# Patient Record
Sex: MALE | Race: WHITE | NOT HISPANIC OR LATINO
[De-identification: names, ages, dates, MRNs, and addresses within clinical notes are randomized per-mention and may not be internally consistent; named-entity substitution may affect disease eponyms.]

---

## 2019-05-22 PROBLEM — Z00.00 ENCOUNTER FOR PREVENTIVE HEALTH EXAMINATION: Status: ACTIVE | Noted: 2019-05-22

## 2019-06-14 ENCOUNTER — APPOINTMENT (OUTPATIENT)
Dept: GASTROENTEROLOGY | Facility: CLINIC | Age: 54
End: 2019-06-14
Payer: COMMERCIAL

## 2019-06-14 VITALS
OXYGEN SATURATION: 98 % | WEIGHT: 212 LBS | SYSTOLIC BLOOD PRESSURE: 138 MMHG | HEIGHT: 71 IN | BODY MASS INDEX: 29.68 KG/M2 | HEART RATE: 72 BPM | DIASTOLIC BLOOD PRESSURE: 90 MMHG

## 2019-06-14 DIAGNOSIS — K86.2 CYST OF PANCREAS: ICD-10-CM

## 2019-06-14 PROCEDURE — 99244 OFF/OP CNSLTJ NEW/EST MOD 40: CPT

## 2019-06-14 NOTE — PHYSICAL EXAM
[General Appearance - Alert] : alert [Sclera] : the sclera and conjunctiva were normal [General Appearance - In No Acute Distress] : in no acute distress [Outer Ear] : the ears and nose were normal in appearance [Neck Appearance] : the appearance of the neck was normal [] : no respiratory distress [Apical Impulse] : the apical impulse was normal [Abdomen Soft] : soft [Abdomen Tenderness] : non-tender [Abnormal Walk] : normal gait [Skin Color & Pigmentation] : normal skin color and pigmentation [Oriented To Time, Place, And Person] : oriented to person, place, and time [Cranial Nerves] : cranial nerves 2-12 were intact

## 2019-06-14 NOTE — ASSESSMENT
[FreeTextEntry1] : Will plan on an EUS in 1 year.  I have discussed current recommendations for pancreatic cyst surveillance in detail with Mr. Johnson.  Currently, pancreatic cysts less than 1 cm without additional high risk imaging features are very likely benign in nature and can likely be monitored initially with yearly surveillance, followed by extending the surveillance interval.  I have offered an EUS at this time if he is uncomfortable with his previous imaging, he prefers to wait 1 year, which I think is reasonable.\par \par Thank you for referring Mr. DHALIWAL.  Please do not hesitate to call to further discuss his/her care.\par \par Note faxed to requesting MD.\par \par

## 2019-06-14 NOTE — HISTORY OF PRESENT ILLNESS
[FreeTextEntry1] : Mr. Latif is a pleasant 54M h/o depression/anxiety, HTN, HLD, cholecystectomy who is seen at the request of Dr. Israel for evaluation of pancreatic cysts.  He brings an MRI report from [facility] recently performed (see scanned report for full details) showing two 4 mm pancreatic cysts, no additional abnormalities.  He feels well, no complaints.  No abdominal pain, weight loss, jaundice/icterus, diarrhea or constipation.\par \par He has a family history of colorectal cancer in his mother in her late 30's or early 40's, he has been having a colonoscopy every 5 years due to increased risk, has had a total of 3 colonoscopies previously.\par \par Has never had other pancreatic imaging.  No history of drinking or smoking.  No other complaints.

## 2020-07-19 ENCOUNTER — RESULT REVIEW (OUTPATIENT)
Age: 55
End: 2020-07-19

## 2020-07-21 ENCOUNTER — RESULT REVIEW (OUTPATIENT)
Age: 55
End: 2020-07-21

## 2020-07-22 ENCOUNTER — APPOINTMENT (OUTPATIENT)
Dept: GASTROENTEROLOGY | Facility: HOSPITAL | Age: 55
End: 2020-07-22

## 2021-08-26 ENCOUNTER — NON-APPOINTMENT (OUTPATIENT)
Age: 56
End: 2021-08-26

## 2021-08-26 DIAGNOSIS — Z78.9 OTHER SPECIFIED HEALTH STATUS: ICD-10-CM

## 2021-08-26 DIAGNOSIS — Z98.890 OTHER SPECIFIED POSTPROCEDURAL STATES: ICD-10-CM

## 2021-08-26 DIAGNOSIS — G98.8 OTHER DISORDERS OF NERVOUS SYSTEM: ICD-10-CM

## 2021-08-26 DIAGNOSIS — Z87.891 PERSONAL HISTORY OF NICOTINE DEPENDENCE: ICD-10-CM

## 2021-09-07 ENCOUNTER — RESULT REVIEW (OUTPATIENT)
Age: 56
End: 2021-09-07

## 2021-09-10 ENCOUNTER — APPOINTMENT (OUTPATIENT)
Dept: SURGERY | Facility: HOSPITAL | Age: 56
End: 2021-09-10
Payer: COMMERCIAL

## 2021-09-10 PROCEDURE — 49650 LAP ING HERNIA REPAIR INIT: CPT | Mod: 50

## 2021-09-23 ENCOUNTER — APPOINTMENT (OUTPATIENT)
Dept: GASTROENTEROLOGY | Facility: CLINIC | Age: 56
End: 2021-09-23
Payer: COMMERCIAL

## 2021-09-23 VITALS
SYSTOLIC BLOOD PRESSURE: 120 MMHG | HEIGHT: 71 IN | WEIGHT: 191 LBS | TEMPERATURE: 96.7 F | HEART RATE: 60 BPM | OXYGEN SATURATION: 98 % | DIASTOLIC BLOOD PRESSURE: 78 MMHG | BODY MASS INDEX: 26.74 KG/M2

## 2021-09-23 DIAGNOSIS — K59.09 OTHER CONSTIPATION: ICD-10-CM

## 2021-09-23 PROCEDURE — 99213 OFFICE O/P EST LOW 20 MIN: CPT

## 2021-09-23 NOTE — HISTORY OF PRESENT ILLNESS
[FreeTextEntry1] : Mr. Latif is a pleasant 56M h/o depression/anxiety, HTN, HLD, cholecystectomy who returns for f/u.\par \par Recently he has been having worsening constipation.  He has gone 7-8 days without a BM, will use dulcolax (rectal or PO) with a resultant large brown BM. No blood or black stool.\par \par Feels well, no discomfort.\par \par Has increased water intake, does not eat much fiber.\par \par Has lost significant weight through diet and exercise, all intentionally.\par \par He has a family history of colorectal cancer in his mother in her late 30's or early 40's, he has been having a colonoscopy every 5 years due to increased risk, has had a total of 3 colonoscopies previously.  Most recent was 2018 by Dr. Winn.

## 2021-09-23 NOTE — ASSESSMENT
[FreeTextEntry1] : Will start miralax 1 dose every evening.\par \par Advised to increase fiber intake.\par \par Will continue with his adequate water intake.\par \par If not improving, would give a trial of Linzess.

## 2021-09-28 ENCOUNTER — APPOINTMENT (OUTPATIENT)
Dept: SURGERY | Facility: CLINIC | Age: 56
End: 2021-09-28
Payer: COMMERCIAL

## 2021-09-28 VITALS — BODY MASS INDEX: 26.74 KG/M2 | WEIGHT: 191 LBS | HEIGHT: 71 IN

## 2021-09-28 DIAGNOSIS — E78.5 HYPERLIPIDEMIA, UNSPECIFIED: ICD-10-CM

## 2021-09-28 DIAGNOSIS — K40.00 BILATERAL INGUINAL HERNIA, WITH OBSTRUCTION, W/OUT GANGRENE, NOT SPECIFIED AS RECURRENT: ICD-10-CM

## 2021-09-28 DIAGNOSIS — I10 ESSENTIAL (PRIMARY) HYPERTENSION: ICD-10-CM

## 2021-09-28 DIAGNOSIS — F31.9 BIPOLAR DISORDER, UNSPECIFIED: ICD-10-CM

## 2021-09-28 DIAGNOSIS — K40.90 UNILATERAL INGUINAL HERNIA, W/OUT OBSTRUCTION OR GANGRENE, NOT SPECIFIED AS RECURRENT: ICD-10-CM

## 2021-09-28 DIAGNOSIS — M10.9 GOUT, UNSPECIFIED: ICD-10-CM

## 2021-09-28 DIAGNOSIS — G47.30 SLEEP APNEA, UNSPECIFIED: ICD-10-CM

## 2021-09-28 DIAGNOSIS — K21.9 GASTRO-ESOPHAGEAL REFLUX DISEASE W/OUT ESOPHAGITIS: ICD-10-CM

## 2021-09-28 PROCEDURE — 99024 POSTOP FOLLOW-UP VISIT: CPT

## 2021-09-28 RX ORDER — ESCITALOPRAM OXALATE 20 MG/1
20 TABLET ORAL
Refills: 0 | Status: ACTIVE | COMMUNITY

## 2021-09-28 RX ORDER — CARVEDILOL PHOSPHATE 40 MG/1
40 CAPSULE, EXTENDED RELEASE ORAL
Refills: 0 | Status: ACTIVE | COMMUNITY

## 2021-09-28 RX ORDER — LAMOTRIGINE 200 MG/1
200 TABLET ORAL
Refills: 0 | Status: ACTIVE | COMMUNITY

## 2021-09-28 RX ORDER — BUPROPION HYDROCHLORIDE 150 MG/1
150 TABLET, EXTENDED RELEASE ORAL
Refills: 0 | Status: ACTIVE | COMMUNITY

## 2021-09-28 RX ORDER — ASPIRIN 81 MG
81 TABLET, DELAYED RELEASE (ENTERIC COATED) ORAL
Refills: 0 | Status: ACTIVE | COMMUNITY

## 2021-09-28 RX ORDER — AMLODIPINE BESYLATE 5 MG/1
5 TABLET ORAL
Refills: 0 | Status: ACTIVE | COMMUNITY

## 2021-09-28 RX ORDER — ALLOPURINOL 100 MG/1
100 TABLET ORAL
Refills: 0 | Status: ACTIVE | COMMUNITY

## 2021-09-28 RX ORDER — BUPROPION HYDROCHLORIDE 300 MG/1
300 TABLET, EXTENDED RELEASE ORAL
Refills: 0 | Status: ACTIVE | COMMUNITY

## 2021-09-28 RX ORDER — ATORVASTATIN CALCIUM 40 MG/1
40 TABLET, FILM COATED ORAL
Refills: 0 | Status: ACTIVE | COMMUNITY

## 2021-09-28 NOTE — ASSESSMENT
[FreeTextEntry1] : Patient presents today for follow-up after bilateral lap inguinal hernia repair. He is no complaints at this time. Patient happy with the results. He was concerned he may have ripped his repair given the fact that he had a severe issue with chronic constipation. Patient denies having a lump at this point he has had no complaints of nausea or vomiting.\par \par Physical examination the incisions healed nicely there is no evidence of infection hematoma seroma or ecchymosis. He has no evidence recurrence bilaterally scrotum testicles within normal limits testicles nontender.\par Impression/plan status post bilateral lap inguinal hernia repair patient doing well surgically no acute findings patient to return here on a as needed basis

## 2022-01-11 ENCOUNTER — NON-APPOINTMENT (OUTPATIENT)
Age: 57
End: 2022-01-11

## 2022-12-05 RX ORDER — LANSOPRAZOLE 30 MG/1
30 CAPSULE, DELAYED RELEASE ORAL
Qty: 90 | Refills: 3 | Status: ACTIVE | COMMUNITY
Start: 1900-01-01 | End: 1900-01-01

## 2022-12-11 ENCOUNTER — RESULT REVIEW (OUTPATIENT)
Age: 57
End: 2022-12-11

## 2022-12-14 ENCOUNTER — APPOINTMENT (OUTPATIENT)
Dept: GASTROENTEROLOGY | Facility: HOSPITAL | Age: 57
End: 2022-12-14

## 2023-01-23 ENCOUNTER — APPOINTMENT (OUTPATIENT)
Dept: GASTROENTEROLOGY | Facility: CLINIC | Age: 58
End: 2023-01-23

## 2023-04-26 ENCOUNTER — RX RENEWAL (OUTPATIENT)
Age: 58
End: 2023-04-26

## 2023-04-26 RX ORDER — LINACLOTIDE 145 UG/1
145 CAPSULE, GELATIN COATED ORAL DAILY
Qty: 90 | Refills: 3 | Status: ACTIVE | COMMUNITY
Start: 2023-04-26 | End: 1900-01-01

## 2023-04-26 RX ORDER — LINACLOTIDE 145 UG/1
145 CAPSULE, GELATIN COATED ORAL
Qty: 30 | Refills: 0 | Status: ACTIVE | COMMUNITY
Start: 2023-04-26 | End: 1900-01-01

## 2024-01-07 ENCOUNTER — TRANSCRIPTION ENCOUNTER (OUTPATIENT)
Age: 59
End: 2024-01-07

## 2024-01-15 ENCOUNTER — TRANSCRIPTION ENCOUNTER (OUTPATIENT)
Age: 59
End: 2024-01-15

## 2024-01-22 ENCOUNTER — APPOINTMENT (OUTPATIENT)
Dept: SURGERY | Facility: CLINIC | Age: 59
End: 2024-01-22
Payer: COMMERCIAL

## 2024-01-22 VITALS
HEIGHT: 71 IN | DIASTOLIC BLOOD PRESSURE: 70 MMHG | TEMPERATURE: 98.1 F | SYSTOLIC BLOOD PRESSURE: 129 MMHG | WEIGHT: 195.8 LBS | HEART RATE: 83 BPM | BODY MASS INDEX: 27.41 KG/M2 | OXYGEN SATURATION: 98 %

## 2024-01-22 DIAGNOSIS — K56.609 UNSPECIFIED INTESTINAL OBSTRUCTION, UNSPECIFIED AS TO PARTIAL VERSUS COMPLETE OBSTRUCTION: ICD-10-CM

## 2024-01-22 PROCEDURE — 99024 POSTOP FOLLOW-UP VISIT: CPT

## 2024-01-22 RX ORDER — ICOSAPENT ETHYL 1000 MG/1
1 CAPSULE ORAL
Refills: 0 | Status: COMPLETED | COMMUNITY
End: 2024-01-22

## 2024-01-22 RX ORDER — ESCITALOPRAM OXALATE 5 MG/1
TABLET, FILM COATED ORAL
Refills: 0 | Status: COMPLETED | COMMUNITY
End: 2024-01-22

## 2024-01-22 RX ORDER — ESOMEPRAZOLE MAGNESIUM 20 MG/1
20 CAPSULE, DELAYED RELEASE ORAL DAILY
Qty: 90 | Refills: 3 | Status: COMPLETED | COMMUNITY
Start: 2022-12-14 | End: 2024-01-22

## 2024-01-22 RX ORDER — OLANZAPINE 2.5 MG/1
2.5 TABLET ORAL
Refills: 0 | Status: COMPLETED | COMMUNITY
End: 2024-01-22

## 2024-01-22 NOTE — PLAN
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  I suspect that he had this problem from a combination of COVID and multiple psychiatric/anxiety medications.  He is currently doing well.  He is eating going to the bathroom.  He has no complaints.  He is scheduled to see Dr. Lucia for upper and lower endoscopy to try to explain weight loss which is hopefully secondary to increased exercise.  On exam his incision is well-healed.  His staples are removed at this time.  Plan: Patient will avoid heavy lifting for an additional month.  He will follow me on as-needed basis.  I explained to him the risk of future incisional hernia as well as adhesive bowel obstruction and he understands what symptoms to look out for.

## 2024-01-23 DIAGNOSIS — R63.4 ABNORMAL WEIGHT LOSS: ICD-10-CM

## 2024-02-18 ENCOUNTER — TRANSCRIPTION ENCOUNTER (OUTPATIENT)
Age: 59
End: 2024-02-18

## 2024-03-18 ENCOUNTER — RX RENEWAL (OUTPATIENT)
Age: 59
End: 2024-03-18

## 2024-03-18 RX ORDER — LINACLOTIDE 290 UG/1
290 CAPSULE, GELATIN COATED ORAL
Qty: 90 | Refills: 0 | Status: ACTIVE | COMMUNITY
Start: 2021-11-10 | End: 1900-01-01

## 2024-03-19 ENCOUNTER — RESULT REVIEW (OUTPATIENT)
Age: 59
End: 2024-03-19

## 2024-03-20 ENCOUNTER — APPOINTMENT (OUTPATIENT)
Dept: GASTROENTEROLOGY | Facility: HOSPITAL | Age: 59
End: 2024-03-20

## 2024-04-15 ENCOUNTER — APPOINTMENT (OUTPATIENT)
Dept: RADIATION ONCOLOGY | Facility: CLINIC | Age: 59
End: 2024-04-15
Payer: COMMERCIAL

## 2024-04-15 VITALS
SYSTOLIC BLOOD PRESSURE: 128 MMHG | HEART RATE: 82 BPM | RESPIRATION RATE: 18 BRPM | BODY MASS INDEX: 27.3 KG/M2 | HEIGHT: 71 IN | TEMPERATURE: 97.9 F | WEIGHT: 195 LBS | DIASTOLIC BLOOD PRESSURE: 71 MMHG | OXYGEN SATURATION: 98 %

## 2024-04-15 DIAGNOSIS — C61 MALIGNANT NEOPLASM OF PROSTATE: ICD-10-CM

## 2024-04-15 PROCEDURE — 99205 OFFICE O/P NEW HI 60 MIN: CPT | Mod: 25

## 2024-04-15 NOTE — HISTORY OF PRESENT ILLNESS
[FreeTextEntry1] : Mr. Latif is a 58 year old male, diagnosed with prostate cancer, referred to our office for a consultation to discuss radiation therapy options.   Mr. Latif reports his PSA increased from 1.78 ng/ml to 2.5 ng/ml in November 2023. He underwent MRI Pelvis (12/20/23), which demonstrated PIRADS 4 - lesions in the prostate - left transition zone and possible posterior peripheral zone. Nonspecific osseous lesion right medial iliac bone. Suggested whole body scan. Prostate biopsy (2/16/24) revealed 4 / 13 positive cores for prostatic adenocarcinoma and [place] 3+4=7 disease.   Staging PET/CT (3/13/24): several foci in the prostate consistent with known prostatic adenocarcinoma. A single lesion is seen within the right ilium with SUV 3.8, suspicious for metastatic disease.   Decipher score was high risk - 0.72.   He denies any urinary frequency, urgency, dysuria, weak stream, dribbling, double voiding. He has nocturia 1-2 times per night. He has a history of chronic constipation and is on Linzess. He recently had EGD and colonoscopy in March 2024. He denies any pertinent pain to the right hip. He had pain approximately 1.5 to 2 years ago that last 1 weeks and attributed it to muscular pain.   Urologist: Dr. Coles (formerly Dr. Barfield at [facility]) He was started on bicalutamide and is scheduled for his first ADT injection on 4/23/24.   Social Hx: past smoker: 16 yrs. x 1 ppd and quit 25 years ago; no ETOH use. Family Hx: mother, colon cancer; father, prostate cancer; brother prostate cancer, pending surgery; sister, ovarian cancer; sister, breast cancer.   Mr. Latif is present today to discuss radiation therapy treatment options.

## 2024-04-16 DIAGNOSIS — M89.9 DISORDER OF BONE, UNSPECIFIED: ICD-10-CM

## 2024-04-20 ENCOUNTER — RESULT REVIEW (OUTPATIENT)
Age: 59
End: 2024-04-20

## 2024-06-25 ENCOUNTER — RESULT REVIEW (OUTPATIENT)
Age: 59
End: 2024-06-25

## 2024-06-25 ENCOUNTER — APPOINTMENT (OUTPATIENT)
Dept: HEMATOLOGY ONCOLOGY | Facility: CLINIC | Age: 59
End: 2024-06-25

## 2024-06-25 VITALS
OXYGEN SATURATION: 95 % | RESPIRATION RATE: 16 BRPM | HEIGHT: 71 IN | WEIGHT: 204 LBS | TEMPERATURE: 97.4 F | HEART RATE: 64 BPM | SYSTOLIC BLOOD PRESSURE: 129 MMHG | DIASTOLIC BLOOD PRESSURE: 76 MMHG | BODY MASS INDEX: 28.56 KG/M2

## 2024-06-25 PROCEDURE — 36415 COLL VENOUS BLD VENIPUNCTURE: CPT

## 2024-06-25 PROCEDURE — 99205 OFFICE O/P NEW HI 60 MIN: CPT

## 2024-06-25 RX ORDER — SODIUM SULFATE, POTASSIUM SULFATE AND MAGNESIUM SULFATE 1.6; 3.13; 17.5 G/177ML; G/177ML; G/177ML
17.5-3.13-1.6 SOLUTION ORAL
Qty: 1 | Refills: 0 | Status: COMPLETED | COMMUNITY
Start: 2024-01-23 | End: 2024-06-25

## 2024-06-27 ENCOUNTER — NON-APPOINTMENT (OUTPATIENT)
Age: 59
End: 2024-06-27

## 2024-07-03 ENCOUNTER — RESULT REVIEW (OUTPATIENT)
Age: 59
End: 2024-07-03

## 2024-07-07 ENCOUNTER — RESULT REVIEW (OUTPATIENT)
Age: 59
End: 2024-07-07

## 2024-07-08 ENCOUNTER — RESULT REVIEW (OUTPATIENT)
Age: 59
End: 2024-07-08

## 2024-07-23 ENCOUNTER — NON-APPOINTMENT (OUTPATIENT)
Age: 59
End: 2024-07-23

## 2024-07-23 VITALS
WEIGHT: 198 LBS | OXYGEN SATURATION: 96 % | HEIGHT: 71 IN | HEART RATE: 65 BPM | BODY MASS INDEX: 27.72 KG/M2 | TEMPERATURE: 98.8 F | SYSTOLIC BLOOD PRESSURE: 137 MMHG | DIASTOLIC BLOOD PRESSURE: 84 MMHG | RESPIRATION RATE: 16 BRPM

## 2024-07-25 ENCOUNTER — RESULT REVIEW (OUTPATIENT)
Age: 59
End: 2024-07-25

## 2024-07-25 ENCOUNTER — APPOINTMENT (OUTPATIENT)
Dept: HEMATOLOGY ONCOLOGY | Facility: CLINIC | Age: 59
End: 2024-07-25
Payer: COMMERCIAL

## 2024-07-25 VITALS
TEMPERATURE: 97.7 F | BODY MASS INDEX: 28.33 KG/M2 | WEIGHT: 202.38 LBS | HEIGHT: 71 IN | RESPIRATION RATE: 16 BRPM | HEART RATE: 60 BPM | DIASTOLIC BLOOD PRESSURE: 72 MMHG | OXYGEN SATURATION: 97 % | SYSTOLIC BLOOD PRESSURE: 134 MMHG

## 2024-07-25 DIAGNOSIS — M85.89 OTHER SPECIFIED DISORDERS OF BONE DENSITY AND STRUCTURE, MULTIPLE SITES: ICD-10-CM

## 2024-07-25 DIAGNOSIS — K86.2 CYST OF PANCREAS: ICD-10-CM

## 2024-07-25 PROCEDURE — 36415 COLL VENOUS BLD VENIPUNCTURE: CPT

## 2024-07-25 PROCEDURE — 99214 OFFICE O/P EST MOD 30 MIN: CPT

## 2024-07-25 NOTE — ASSESSMENT
[FreeTextEntry1] : 59 year old male presents today for prostate cancer, referred by Dr. Peters.  Patient reports his PSA increased from 1.78 ng/ml to 2.5 ng/ml in November 2023. He underwent MRI Pelvis (12/20/23), which demonstrated PIRADS 4 - lesions in the prostate - left transition zone and possible posterior peripheral zone. Nonspecific osseous lesion right medial iliac bone. Prostate biopsy (2/16/24) revealed 4 / 13 positive cores for prostatic adenocarcinoma and [place] 3+4=7 disease.   Decipher score was high risk - 0.72.  Staging PET/CT (3/13/24): several foci in the prostate consistent with known prostatic adenocarcinoma. A single lesion was seen within the right ilium with SUV 3.8, suspicious for metastatic disease.  Due to the size of the uptake biopsy was defered and repeat PET scan was planned for June.  PET CT PSMA (6/12/24); Persistent uptake in a metastatic lesion at the lower right iliac wing. No new sites concerning for DCFPyL-avid metastatic disease are identified. Multifocal uptake in the prostate is less conspicuous when compared to prior study.  Biopsy of the iliac bone lesion- negative Reviewed adverse effects including but not limited to hot flushes, loss of libido, erectile disfunction, loss of muscle mass, fatigue, anemia, breast enlargement, tenderness, depression with mood swings, hair loss, osteoporosis, increased risk of fractures, obesity, dyslipidemia, increased risk of diabetes and cardiovascular disease.  Discussed weight control and healthy eating habits.  Encourage to participate in moderate exercise to mitigate side effects and consider starting statins. Screening for osteoporosis with bone density and prevention with calcium 1000 mg from food and diet, vitamin D 1000 IU daily.  Genetic - negative  last bone density Dexa 7/24- osteopenia T-score -1.1 Risk factors Recommended: 1. Vitamin D 2. Calcium supplement 500mg 3. Weight bearing exercises - will plan for Zometa after dental implants   # Pancreatic cyst - MRI due

## 2024-07-25 NOTE — REASON FOR VISIT
[Initial Consultation] : an initial consultation [Follow-Up Visit] : a follow-up [FreeTextEntry2] : prostate ca

## 2024-07-25 NOTE — ASSESSMENT
[FreeTextEntry1] : 59 year old male presents today for prostate cancer, referred by Dr. Peters.  Patient reports his PSA increased from 1.78 ng/ml to 2.5 ng/ml in November 2023. He underwent MRI Pelvis (12/20/23), which demonstrated PIRADS 4 - lesions in the prostate - left transition zone and possible posterior peripheral zone. Nonspecific osseous lesion right medial iliac bone. Prostate biopsy (2/16/24) revealed 4 / 13 positive cores for prostatic adenocarcinoma and [place] 3+4=7 disease.   Decipher score was high risk - 0.72.  Staging PET/CT (3/13/24): several foci in the prostate consistent with known prostatic adenocarcinoma. A single lesion was seen within the right ilium with SUV 3.8, suspicious for metastatic disease.  Due to the size of the uptake biopsy was defered and repeat PET scan was planned for June.  PET CT PSMA (6/12/24); Persistent uptake in a metastatic lesion at the lower right iliac wing. No new sites concerning for DCFPyL-avid metastatic disease are identified. Multifocal uptake in the prostate is less conspicuous when compared to prior study.  Biopsy of the iliac bone lesion- negative Reviewed adverse effects including but not limited to hot flushes, loss of libido, erectile disfunction, loss of muscle mass, fatigue, anemia, breast enlargement, tenderness, depression with mood swings, hair loss, osteoporosis, increased risk of fractures, obesity, dyslipidemia, increased risk of diabetes and cardiovascular disease.  Discussed weight control and healthy eating habits.  Encourage to participate in moderate exercise to mitigate side effects and consider starting statins. Screening for osteoporosis with bone density and prevention with calcium 1000 mg from food and diet, vitamin D 1000 IU daily.  Genetic - negative  last bone density Dexa 7/24- osteopenia T-score -1.1 Risk factors Recommended: 1. Vitamin D 2. Calcium supplement 500mg 3. Weight bearing exercises - will plan for Zometa after dental implants   # Pancreatic cyst - MRI due

## 2024-07-25 NOTE — HISTORY OF PRESENT ILLNESS
[Disease: _____________________] : Disease: [unfilled] [T: ___] : T[unfilled] [M: ___] : M[unfilled] [AJCC Stage: ____] : AJCC Stage: [unfilled] [de-identified] : 59 year old male presents today for prostate cancer, referred by Dr. Peters.  Patient reports his PSA increased from 1.78 ng/ml to 2.5 ng/ml in November 2023. He underwent MRI Pelvis (12/20/23), which demonstrated PIRADS 4 - lesions in the prostate - left transition zone and possible posterior peripheral zone. Nonspecific osseous lesion right medial iliac bone. Prostate biopsy (2/16/24) revealed 4 / 13 positive cores for prostatic adenocarcinoma and [place] 3+4=7 disease.   Decipher score was high risk - 0.72.  Staging PET/CT (3/13/24): several foci in the prostate consistent with known prostatic adenocarcinoma. A single lesion was seen within the right ilium with SUV 3.8, suspicious for metastatic disease.  Due to the size of the uptake biopsy was defered and repeat PET scan was planned for June.  PET CT PSMA (6/12/24); Persistent uptake in a metastatic lesion at the lower right iliac wing. No new sites concerning for DCFPyL-avid metastatic disease are identified. Multifocal uptake in the prostate is less conspicuous when compared to prior study.   Urologist: Dr. Coles (formerly Dr. Barfield at [facility]) He was started on bicalutamide began his first ADT injection on 4/23/24.  Family Hx:  mother, colon cancer; father, prostate cancer; brother prostate cancer, pending surgery; sister, ovarian cancer; sister, breast cancer.  Social Hx:16 yrs. x 1 ppd and quit 25 years ago; no ETOH use.  [de-identified] : Patient is here for follow up for prostate cancer. Patient

## 2024-07-25 NOTE — HISTORY OF PRESENT ILLNESS
[FreeTextEntry1] : Mr. Latif is a 59-year-old male, recently diagnosed with Stage IIB, [id number] adenocarcinoma of the prostate, [place] score 3+4, currently undergoing radiation therapy.   He presents today for OTV. He is s/p fraction 2/27 of radiation to the prostate and seminal vesicles. We discussed following the prostate EBRT with SBRT to the right iliac lesion if area is persistent on reimaging.

## 2024-07-25 NOTE — HISTORY OF PRESENT ILLNESS
[Disease: _____________________] : Disease: [unfilled] [T: ___] : T[unfilled] [M: ___] : M[unfilled] [AJCC Stage: ____] : AJCC Stage: [unfilled] [de-identified] : 59 year old male presents today for prostate cancer, referred by Dr. Peters.  Patient reports his PSA increased from 1.78 ng/ml to 2.5 ng/ml in November 2023. He underwent MRI Pelvis (12/20/23), which demonstrated PIRADS 4 - lesions in the prostate - left transition zone and possible posterior peripheral zone. Nonspecific osseous lesion right medial iliac bone. Prostate biopsy (2/16/24) revealed 4 / 13 positive cores for prostatic adenocarcinoma and [place] 3+4=7 disease.   Decipher score was high risk - 0.72.  Staging PET/CT (3/13/24): several foci in the prostate consistent with known prostatic adenocarcinoma. A single lesion was seen within the right ilium with SUV 3.8, suspicious for metastatic disease.  Due to the size of the uptake biopsy was defered and repeat PET scan was planned for June.  PET CT PSMA (6/12/24); Persistent uptake in a metastatic lesion at the lower right iliac wing. No new sites concerning for DCFPyL-avid metastatic disease are identified. Multifocal uptake in the prostate is less conspicuous when compared to prior study.   Urologist: Dr. Coles (formerly Dr. Barfield at [facility]) He was started on bicalutamide began his first ADT injection on 4/23/24.  Family Hx:  mother, colon cancer; father, prostate cancer; brother prostate cancer, pending surgery; sister, ovarian cancer; sister, breast cancer.  Social Hx:16 yrs. x 1 ppd and quit 25 years ago; no ETOH use.  [de-identified] : Patient is here for follow up for prostate cancer. Patient

## 2024-07-25 NOTE — HISTORY OF PRESENT ILLNESS
[Disease: _____________________] : Disease: [unfilled] [T: ___] : T[unfilled] [M: ___] : M[unfilled] [AJCC Stage: ____] : AJCC Stage: [unfilled] [de-identified] : 59 year old male presents today for prostate cancer, referred by Dr. Peters.  Patient reports his PSA increased from 1.78 ng/ml to 2.5 ng/ml in November 2023. He underwent MRI Pelvis (12/20/23), which demonstrated PIRADS 4 - lesions in the prostate - left transition zone and possible posterior peripheral zone. Nonspecific osseous lesion right medial iliac bone. Prostate biopsy (2/16/24) revealed 4 / 13 positive cores for prostatic adenocarcinoma and [place] 3+4=7 disease.   Decipher score was high risk - 0.72.  Staging PET/CT (3/13/24): several foci in the prostate consistent with known prostatic adenocarcinoma. A single lesion was seen within the right ilium with SUV 3.8, suspicious for metastatic disease.  Due to the size of the uptake biopsy was defered and repeat PET scan was planned for June.  PET CT PSMA (6/12/24); Persistent uptake in a metastatic lesion at the lower right iliac wing. No new sites concerning for DCFPyL-avid metastatic disease are identified. Multifocal uptake in the prostate is less conspicuous when compared to prior study.   Urologist: Dr. Coles (formerly Dr. Barfield at [facility]) He was started on bicalutamide began his first ADT injection on 4/23/24.  Family Hx:  mother, colon cancer; father, prostate cancer; brother prostate cancer, pending surgery; sister, ovarian cancer; sister, breast cancer.  Social Hx:16 yrs. x 1 ppd and quit 25 years ago; no ETOH use.  [de-identified] : Patient is here for follow up for prostate cancer. Patient

## 2024-07-25 NOTE — ASSESSMENT
[FreeTextEntry1] : 59 year old male presents today for prostate cancer, referred by Dr. Peters.  Patient reports his PSA increased from 1.78 ng/ml to 2.5 ng/ml in November 2023. He underwent MRI Pelvis (12/20/23), which demonstrated PIRADS 4 - lesions in the prostate - left transition zone and possible posterior peripheral zone. Nonspecific osseous lesion right medial iliac bone. Prostate biopsy (2/16/24) revealed 4 / 13 positive cores for prostatic adenocarcinoma and [place] 3+4=7 disease.   Decipher score was high risk - 0.72.  Staging PET/CT (3/13/24): several foci in the prostate consistent with known prostatic adenocarcinoma. A single lesion was seen within the right ilium with SUV 3.8, suspicious for metastatic disease.  Due to the size of the uptake biopsy was defered and repeat PET scan was planned for June.  PET CT PSMA (6/12/24); Persistent uptake in a metastatic lesion at the lower right iliac wing. No new sites concerning for DCFPyL-avid metastatic disease are identified. Multifocal uptake in the prostate is less conspicuous when compared to prior study.  Biopsy of the iliac bone lesion- negative Reviewed adverse effects including but not limited to hot flushes, loss of libido, erectile disfunction, loss of muscle mass, fatigue, anemia, breast enlargement, tenderness, depression with mood swings, hair loss, osteoporosis, increased risk of fractures, obesity, dyslipidemia, increased risk of diabetes and cardiovascular disease.  Discussed weight control and healthy eating habits.  Encourage to participate in moderate exercise to mitigate side effects and consider starting statins. Screening for osteoporosis with bone density and prevention with calcium 1000 mg from food and diet, vitamin D 1000 IU daily.  Genetic - negative  last bone density Dexa 7/24- osteopenia T-score -1.1 Risk factors Recommended: 1. Vitamin D 2. Calcium supplement 500mg 3. Weight bearing exercises - will plan for Zometa after dental implants   # Pancreatic cyst - MRI due

## 2024-07-25 NOTE — DISEASE MANAGEMENT
[Clinical] : TNM Stage: c [IIB] : IIB [FreeTextEntry4] : Adenocarcinoma of the prostate, GS 3+4, PSA 2.5ng/ml [TTNM] : 1c [NTNM] : 0 [MTNM] : X [de-identified] : 500cGy [de-identified] : 6750cGy [de-identified] : prostate/SV

## 2024-07-25 NOTE — CONSULT LETTER
[Dear  ___] : Dear  [unfilled], [Consult Letter:] : I had the pleasure of evaluating your patient, [unfilled]. [Please see my note below.] : Please see my note below. [Consult Closing:] : Thank you very much for allowing me to participate in the care of this patient.  If you have any questions, please do not hesitate to contact me. [Sincerely,] : Sincerely, [FreeTextEntry3] : Nicolette Mclaughlin MD  of Medicine [facility] of Medicine [facility]

## 2024-07-25 NOTE — CONSULT LETTER
[Dear  ___] : Dear  [unfilled], [Consult Letter:] : I had the pleasure of evaluating your patient, [unfilled]. [Please see my note below.] : Please see my note below. [Consult Closing:] : Thank you very much for allowing me to participate in the care of this patient.  If you have any questions, please do not hesitate to contact me. [Sincerely,] : Sincerely, [FreeTextEntry3] : Nicolette Mclaughlin MD  of Medicine [facility] of Medicine [facility]

## 2024-07-25 NOTE — CONSULT LETTER
[Dear  ___] : Dear  [unfilled], [Consult Letter:] : I had the pleasure of evaluating your patient, [unfilled]. [Please see my note below.] : Please see my note below. [Consult Closing:] : Thank you very much for allowing me to participate in the care of this patient.  If you have any questions, please do not hesitate to contact me. [Sincerely,] : Sincerely, [FreeTextEntry3] : Nicolette Mclaughlin MD  of Medicine [facility] of Medicine [facility]

## 2024-07-30 ENCOUNTER — NON-APPOINTMENT (OUTPATIENT)
Age: 59
End: 2024-07-30

## 2024-07-30 VITALS
HEART RATE: 64 BPM | RESPIRATION RATE: 18 BRPM | OXYGEN SATURATION: 98 % | SYSTOLIC BLOOD PRESSURE: 133 MMHG | DIASTOLIC BLOOD PRESSURE: 70 MMHG

## 2024-07-31 ENCOUNTER — NON-APPOINTMENT (OUTPATIENT)
Age: 59
End: 2024-07-31

## 2024-08-01 NOTE — REVIEW OF SYSTEMS
[Constipation: Grade 0] : Constipation: Grade 0 [Diarrhea: Grade 0] : Diarrhea: Grade 0 [Fatigue: Grade 0] : Fatigue: Grade 0 [Urinary Incontinence: Grade 0] : Urinary Incontinence: Grade 0  [Urinary Retention: Grade 0] : Urinary Retention: Grade 0 [Urinary Tract Pain: Grade 0] : Urinary Tract Pain: Grade 0 [Urinary Urgency: Grade 0] : Urinary Urgency: Grade 0 [Urinary Frequency: Grade 1 - Present] : Urinary Frequency: Grade 1 - Present [FreeTextEntry9] : nocturia 3 - 4 times per night

## 2024-08-01 NOTE — HISTORY OF PRESENT ILLNESS
[FreeTextEntry1] : Mr. Latif is a 59-year-old male, recently diagnosed with Stage IIB, [id number] adenocarcinoma of the prostate, [place] score 3+4, currently undergoing radiation therapy.   He presents today for OTV. He is s/p fraction 7/27 of radiation to the prostate and seminal vesicles. He offers no new symptoms or concerns. He reports stable urinary symptoms. He reports nocturia 3 - 4 times per night, which he states is his baseline. He denies any bowel changes. Overall, he is tolerating treatment well.   Plan: Prostate EBRT with SBRT to the right iliac lesion if area is persistent on reimaging.

## 2024-08-01 NOTE — DISEASE MANAGEMENT
[Clinical] : TNM Stage: c [IIB] : IIB [FreeTextEntry4] : Adenocarcinoma of the prostate, GS 3+4, PSA 2.5ng/ml [TTNM] : 1c [NTNM] : 0 [MTNM] : X [de-identified] : 0814cGy [de-identified] : 6750cGy [de-identified] : prostate/SV

## 2024-08-06 ENCOUNTER — NON-APPOINTMENT (OUTPATIENT)
Age: 59
End: 2024-08-06

## 2024-08-06 NOTE — HISTORY OF PRESENT ILLNESS
[FreeTextEntry1] : Mr. Latif is a 59-year-old male, recently diagnosed with Stage IIB, [id number] adenocarcinoma of the prostate, [place] score 3+4, currently undergoing radiation therapy.   He presents today for OTV. He is s/p fraction 12/27 of radiation to the prostate and seminal vesicles. He reports he is tolerating treatment well. He denies any urinary frequency or urgency.  He reports nocturia 3 - 4 times per night, which is his baseline. He denies any bowel symptoms. He reports he is scheduled for MRCP on 8/15/24 ordered by Dr. Mclaughlin to follow-up on pancreatic cyst. Overall, he is doing well.

## 2024-08-06 NOTE — DISEASE MANAGEMENT
[Clinical] : TNM Stage: c [IIB] : IIB [FreeTextEntry4] : Adenocarcinoma of the prostate, GS 3+4, PSA 2.5ng/ml [TTNM] : 1c [MTNM] : X [NTNM] : 0 [de-identified] : 3000 cGy [de-identified] : 6750cGy [de-identified] : prostate/SV

## 2024-08-06 NOTE — DISEASE MANAGEMENT
[Clinical] : TNM Stage: c [IIB] : IIB [FreeTextEntry4] : Adenocarcinoma of the prostate, GS 3+4, PSA 2.5ng/ml [TTNM] : 1c [MTNM] : X [NTNM] : 0 [de-identified] : 3000 cGy [de-identified] : 6750cGy [de-identified] : prostate/SV

## 2024-08-06 NOTE — HISTORY OF PRESENT ILLNESS
[FreeTextEntry1] : Mr. Latif is a 59-year-old male, recently diagnosed with Stage IIB, [id number] adenocarcinoma of the prostate, [place] score 3+4, currently undergoing radiation therapy.   He presents today for OTV. He is s/p fraction 12/27 of radiation to the prostate and seminal vesicles. He reports he is tolerating treatment well. He denies any urinary frequency or urgency.  He reports nocturia 3 - 4 times per night, which is his baseline. He denies any bowel symptoms. He reports he is scheduled for MRCP on 8/15/24 ordered by Dr. Mclaughlin to follow-up on pancreatic cyst. Overall, he is doing well.

## 2024-08-13 ENCOUNTER — NON-APPOINTMENT (OUTPATIENT)
Age: 59
End: 2024-08-13

## 2024-08-13 RX ORDER — TAMSULOSIN HYDROCHLORIDE 0.4 MG/1
0.4 CAPSULE ORAL
Qty: 30 | Refills: 1 | Status: ACTIVE | COMMUNITY
Start: 2024-08-13 | End: 1900-01-01

## 2024-08-13 NOTE — HISTORY OF PRESENT ILLNESS
[FreeTextEntry1] : Mr. Latif is a 59-year-old male, recently diagnosed with Stage IIB, [id number] adenocarcinoma of the prostate, [place] score 3+4, currently undergoing radiation therapy.   He presents today for OTV. He is s/p fraction 17/27 of radiation to the prostate and seminal vesicles. He reports he is feeling well. He reports nocturia 3 - 4 times per night. He is currently not on Flomax.  He denies any bowel symptoms at this time. He reports he is scheduled for MRCP on 8/15/24. He is tolerating radiation therapy well.

## 2024-08-13 NOTE — DISEASE MANAGEMENT
[Clinical] : TNM Stage: c [IIB] : IIB [FreeTextEntry4] : Adenocarcinoma of the prostate, GS 3+4, PSA 2.5ng/ml [TTNM] : 1c [NTNM] : 0 [MTNM] : X [de-identified] : 4250 cGy [de-identified] : 6750cGy [de-identified] : prostate/SV none

## 2024-08-15 ENCOUNTER — RESULT REVIEW (OUTPATIENT)
Age: 59
End: 2024-08-15

## 2024-08-20 ENCOUNTER — NON-APPOINTMENT (OUTPATIENT)
Age: 59
End: 2024-08-20

## 2024-08-26 NOTE — REVIEW OF SYSTEMS
[Constipation: Grade 0] : Constipation: Grade 0 [Diarrhea: Grade 0] : Diarrhea: Grade 0 [Fatigue: Grade 0] : Fatigue: Grade 0 [Urinary Incontinence: Grade 0] : Urinary Incontinence: Grade 0  [Urinary Retention: Grade 0] : Urinary Retention: Grade 0 [Urinary Tract Pain: Grade 0] : Urinary Tract Pain: Grade 0 [Urinary Urgency: Grade 0] : Urinary Urgency: Grade 0 [Urinary Frequency: Grade 1 - Present] : Urinary Frequency: Grade 1 - Present [FreeTextEntry9] : nocturia 3 times per night

## 2024-08-26 NOTE — HISTORY OF PRESENT ILLNESS
[FreeTextEntry1] : Mr. Latif is a 59-year-old male, recently diagnosed with Stage IIB, [id number] adenocarcinoma of the prostate, [place] score 3+4, currently undergoing radiation therapy.   He presents today for OTV. He is s/p fraction 22/27 of radiation to the prostate and seminal vesicles. He offers no new symptoms at this time. He denies urinary frequency and urgency. He is taking Flomax 1-time per night. He reports nocturia 3 times per night. He denies any loose stools. Recent labwork demonstrating PSA of <0.064 (8/13/24). Overall, he is tolerating treatment well.

## 2024-08-26 NOTE — DISEASE MANAGEMENT
[Clinical] : TNM Stage: c [IIB] : IIB [FreeTextEntry4] : Adenocarcinoma of the prostate, GS 3+4, PSA 2.5ng/ml [TTNM] : 1c [NTNM] : 0 [MTNM] : X [de-identified] : 5500 cGy [de-identified] : 6750cGy [de-identified] : prostate/SV

## 2024-08-27 ENCOUNTER — NON-APPOINTMENT (OUTPATIENT)
Age: 59
End: 2024-08-27

## 2024-08-27 DIAGNOSIS — C61 MALIGNANT NEOPLASM OF PROSTATE: ICD-10-CM

## 2024-08-27 NOTE — DISEASE MANAGEMENT
[Clinical] : TNM Stage: c [IIB] : IIB [FreeTextEntry4] : Adenocarcinoma of the prostate, GS 3+4, PSA 2.5ng/ml [TTNM] : 1c [NTNM] : 0 [MTNM] : X [de-identified] : 5500 cGy [de-identified] : 6750cGy [de-identified] : prostate/SV

## 2024-08-27 NOTE — HISTORY OF PRESENT ILLNESS
[FreeTextEntry1] : Mr. Latif is a 59-year-old male, recently diagnosed with Stage IIB, [id number] adenocarcinoma of the prostate, [place] score 3+4, currently undergoing radiation therapy.   He presents today for OTV. He is s/p completion of radiation to the prostate and seminal vesicles today. He offers no new symptoms at this time. He denies urinary frequency and urgency. He is taking Flomax 1-time per night. He reports nocturia 4-5 times per night. He denies any loose stools. Overall, he tolerated treatment well and will return in one month for his PTE.

## 2024-10-11 ENCOUNTER — APPOINTMENT (OUTPATIENT)
Dept: RADIATION ONCOLOGY | Facility: CLINIC | Age: 59
End: 2024-10-11

## 2024-10-11 DIAGNOSIS — C61 MALIGNANT NEOPLASM OF PROSTATE: ICD-10-CM

## 2024-10-11 PROCEDURE — 99024 POSTOP FOLLOW-UP VISIT: CPT

## 2024-10-24 ENCOUNTER — RESULT REVIEW (OUTPATIENT)
Age: 59
End: 2024-10-24

## 2024-10-24 ENCOUNTER — APPOINTMENT (OUTPATIENT)
Dept: HEMATOLOGY ONCOLOGY | Facility: CLINIC | Age: 59
End: 2024-10-24

## 2024-10-24 VITALS
HEART RATE: 72 BPM | SYSTOLIC BLOOD PRESSURE: 119 MMHG | BODY MASS INDEX: 28.28 KG/M2 | WEIGHT: 202 LBS | DIASTOLIC BLOOD PRESSURE: 70 MMHG | RESPIRATION RATE: 16 BRPM | HEIGHT: 71 IN | TEMPERATURE: 97.4 F | OXYGEN SATURATION: 99 %

## 2024-10-24 DIAGNOSIS — C61 MALIGNANT NEOPLASM OF PROSTATE: ICD-10-CM

## 2024-10-24 PROCEDURE — 36415 COLL VENOUS BLD VENIPUNCTURE: CPT

## 2024-10-24 PROCEDURE — 99214 OFFICE O/P EST MOD 30 MIN: CPT

## 2024-11-29 ENCOUNTER — RESULT REVIEW (OUTPATIENT)
Age: 59
End: 2024-11-29

## 2024-11-29 ENCOUNTER — APPOINTMENT (OUTPATIENT)
Dept: HEMATOLOGY ONCOLOGY | Facility: CLINIC | Age: 59
End: 2024-11-29

## 2024-11-29 VITALS
TEMPERATURE: 97.2 F | RESPIRATION RATE: 16 BRPM | OXYGEN SATURATION: 95 % | BODY MASS INDEX: 28.28 KG/M2 | DIASTOLIC BLOOD PRESSURE: 78 MMHG | HEART RATE: 68 BPM | HEIGHT: 71 IN | SYSTOLIC BLOOD PRESSURE: 124 MMHG | WEIGHT: 202 LBS

## 2024-12-18 ENCOUNTER — RESULT REVIEW (OUTPATIENT)
Age: 59
End: 2024-12-18

## 2024-12-31 RX ORDER — LINACLOTIDE 290 UG/1
290 CAPSULE, GELATIN COATED ORAL
Qty: 90 | Refills: 3 | Status: ACTIVE | COMMUNITY
Start: 2024-12-31 | End: 1900-01-01

## 2025-01-06 ENCOUNTER — APPOINTMENT (OUTPATIENT)
Dept: RADIATION ONCOLOGY | Facility: CLINIC | Age: 60
End: 2025-01-06
Payer: MEDICARE

## 2025-01-06 PROCEDURE — G2211 COMPLEX E/M VISIT ADD ON: CPT

## 2025-01-06 PROCEDURE — 99204 OFFICE O/P NEW MOD 45 MIN: CPT

## 2025-01-16 ENCOUNTER — APPOINTMENT (OUTPATIENT)
Dept: RADIATION ONCOLOGY | Facility: CLINIC | Age: 60
End: 2025-01-16

## 2025-01-21 ENCOUNTER — RESULT REVIEW (OUTPATIENT)
Age: 60
End: 2025-01-21

## 2025-01-21 ENCOUNTER — NON-APPOINTMENT (OUTPATIENT)
Age: 60
End: 2025-01-21

## 2025-04-21 ENCOUNTER — RESULT REVIEW (OUTPATIENT)
Age: 60
End: 2025-04-21

## 2025-05-07 ENCOUNTER — APPOINTMENT (OUTPATIENT)
Dept: RADIATION ONCOLOGY | Facility: CLINIC | Age: 60
End: 2025-05-07
Payer: MEDICARE

## 2025-05-07 VITALS
HEIGHT: 71 IN | SYSTOLIC BLOOD PRESSURE: 115 MMHG | HEART RATE: 115 BPM | TEMPERATURE: 96.9 F | RESPIRATION RATE: 16 BRPM | DIASTOLIC BLOOD PRESSURE: 69 MMHG | OXYGEN SATURATION: 97 %

## 2025-05-07 DIAGNOSIS — C61 MALIGNANT NEOPLASM OF PROSTATE: ICD-10-CM

## 2025-05-07 PROCEDURE — 99214 OFFICE O/P EST MOD 30 MIN: CPT

## 2025-05-07 PROCEDURE — G2211 COMPLEX E/M VISIT ADD ON: CPT

## 2025-05-16 ENCOUNTER — NON-APPOINTMENT (OUTPATIENT)
Age: 60
End: 2025-05-16

## 2025-06-12 ENCOUNTER — RESULT REVIEW (OUTPATIENT)
Age: 60
End: 2025-06-12

## 2025-06-12 ENCOUNTER — APPOINTMENT (OUTPATIENT)
Dept: HEMATOLOGY ONCOLOGY | Facility: CLINIC | Age: 60
End: 2025-06-12
Payer: MEDICARE

## 2025-06-12 VITALS
DIASTOLIC BLOOD PRESSURE: 70 MMHG | HEIGHT: 71 IN | RESPIRATION RATE: 16 BRPM | OXYGEN SATURATION: 95 % | HEART RATE: 75 BPM | WEIGHT: 214.13 LBS | TEMPERATURE: 97.1 F | BODY MASS INDEX: 29.98 KG/M2 | SYSTOLIC BLOOD PRESSURE: 110 MMHG

## 2025-06-12 PROCEDURE — 99214 OFFICE O/P EST MOD 30 MIN: CPT

## 2025-06-12 PROCEDURE — 36415 COLL VENOUS BLD VENIPUNCTURE: CPT

## 2025-06-12 PROCEDURE — 99204 OFFICE O/P NEW MOD 45 MIN: CPT

## 2025-07-18 ENCOUNTER — APPOINTMENT (OUTPATIENT)
Dept: RADIATION ONCOLOGY | Facility: CLINIC | Age: 60
End: 2025-07-18
Payer: MEDICARE

## 2025-07-18 PROCEDURE — 99213 OFFICE O/P EST LOW 20 MIN: CPT | Mod: 93

## 2025-09-04 ENCOUNTER — APPOINTMENT (OUTPATIENT)
Dept: HEMATOLOGY ONCOLOGY | Facility: CLINIC | Age: 60
End: 2025-09-04
Payer: MEDICARE

## 2025-09-04 ENCOUNTER — RESULT REVIEW (OUTPATIENT)
Age: 60
End: 2025-09-04

## 2025-09-04 VITALS
HEART RATE: 71 BPM | RESPIRATION RATE: 16 BRPM | WEIGHT: 214.19 LBS | BODY MASS INDEX: 30.66 KG/M2 | HEIGHT: 70 IN | OXYGEN SATURATION: 96 % | DIASTOLIC BLOOD PRESSURE: 73 MMHG | TEMPERATURE: 97.3 F | SYSTOLIC BLOOD PRESSURE: 114 MMHG

## 2025-09-04 DIAGNOSIS — M89.9 DISORDER OF BONE, UNSPECIFIED: ICD-10-CM

## 2025-09-04 DIAGNOSIS — C61 MALIGNANT NEOPLASM OF PROSTATE: ICD-10-CM

## 2025-09-04 DIAGNOSIS — K86.2 CYST OF PANCREAS: ICD-10-CM

## 2025-09-04 PROCEDURE — 99213 OFFICE O/P EST LOW 20 MIN: CPT

## 2025-09-04 PROCEDURE — 36415 COLL VENOUS BLD VENIPUNCTURE: CPT
